# Patient Record
Sex: FEMALE | Race: WHITE | NOT HISPANIC OR LATINO | URBAN - METROPOLITAN AREA
[De-identification: names, ages, dates, MRNs, and addresses within clinical notes are randomized per-mention and may not be internally consistent; named-entity substitution may affect disease eponyms.]

---

## 2017-03-24 ENCOUNTER — APPOINTMENT (RX ONLY)
Dept: URBAN - METROPOLITAN AREA TELEMEDICINE 2 | Facility: TELEMEDICINE | Age: 27
Setting detail: DERMATOLOGY
End: 2017-03-24

## 2017-03-24 DIAGNOSIS — Z41.9 ENCOUNTER FOR PROCEDURE FOR PURPOSES OTHER THAN REMEDYING HEALTH STATE, UNSPECIFIED: ICD-10-CM

## 2017-03-24 PROBLEM — L70.0 ACNE VULGARIS: Status: ACTIVE | Noted: 2017-03-24

## 2017-03-24 PROCEDURE — ? MEDICAL CONSULTATION: DERMAPEN

## 2017-03-24 PROCEDURE — ? MEDICAL CONSULTATION: BROWN SPOTS

## 2017-03-24 PROCEDURE — ? BOTOX

## 2017-03-24 PROCEDURE — ? OTHER (COSMETIC)

## 2017-03-24 PROCEDURE — ? RECOMMENDATIONS

## 2017-03-24 PROCEDURE — ? COSMETIC CONSULTATION: BOTULINUM TOXIN

## 2017-03-24 ASSESSMENT — LOCATION SIMPLE DESCRIPTION DERM
LOCATION SIMPLE: LEFT FOREHEAD
LOCATION SIMPLE: LEFT CHEEK

## 2017-03-24 ASSESSMENT — LOCATION ZONE DERM: LOCATION ZONE: FACE

## 2017-03-24 ASSESSMENT — LOCATION DETAILED DESCRIPTION DERM
LOCATION DETAILED: LEFT INFERIOR CENTRAL MALAR CHEEK
LOCATION DETAILED: LEFT MEDIAL FOREHEAD

## 2017-03-24 NOTE — PROCEDURE: BOTOX
Lateral Platysmal Bands Units: 0
Detail Level: Detailed
Additional Area 2 Location: Lower lids
Forehead Units: 10
Expiration Date (Month Year): 10/2019
Reconstitution Date (Optional): 03/24/2017
Glabellar Complex Units: 20
Dilution (U/0.1 Cc): 5
Additional Area 1 Location: Brows
Post-Care Instructions: Patient instructed to not lie down for 4 hours and limit physical activity for 24 hours. Patient instructed not to travel by airplane for 48 hours.
Price (Use Numbers Only, No Special Characters Or $): 390
Consent: Written consent obtained. Risks include but not limited to lid/brow ptosis, bruising, swelling, diplopia, temporary effect, incomplete chemical denervation.

## 2017-03-24 NOTE — PROCEDURE: OTHER (COSMETIC)
Other (Free Text): Discussed consider adding 4 units to brows at follow up if needed
Detail Level: Zone

## 2017-04-05 ENCOUNTER — APPOINTMENT (RX ONLY)
Dept: URBAN - METROPOLITAN AREA TELEMEDICINE 2 | Facility: TELEMEDICINE | Age: 27
Setting detail: DERMATOLOGY
End: 2017-04-05

## 2017-04-05 DIAGNOSIS — Z41.9 ENCOUNTER FOR PROCEDURE FOR PURPOSES OTHER THAN REMEDYING HEALTH STATE, UNSPECIFIED: ICD-10-CM

## 2017-04-05 PROCEDURE — ? COSMETIC FOLLOW-UP

## 2017-04-05 PROCEDURE — ? OTHER (COSMETIC)

## 2017-04-05 PROCEDURE — ? BOTOX

## 2017-04-05 PROCEDURE — ? COSMETIC CONSULTATION: BOTULINUM TOXIN

## 2017-04-05 ASSESSMENT — LOCATION SIMPLE DESCRIPTION DERM
LOCATION SIMPLE: LEFT EYEBROW
LOCATION SIMPLE: RIGHT EYEBROW

## 2017-04-05 ASSESSMENT — LOCATION DETAILED DESCRIPTION DERM
LOCATION DETAILED: RIGHT CENTRAL EYEBROW
LOCATION DETAILED: LEFT CENTRAL EYEBROW

## 2017-04-05 ASSESSMENT — LOCATION ZONE DERM: LOCATION ZONE: FACE

## 2017-04-05 NOTE — PROCEDURE: BOTOX
Additional Area 2 Units: 0
Lot #: [id number]
Additional Area 1 Location: Brows
Post-Care Instructions: Patient instructed to not lie down for 4 hours and limit physical activity for 24 hours. Patient instructed not to travel by airplane for 48 hours.
Consent: Written consent obtained. Risks include but not limited to lid/brow ptosis, bruising, swelling, diplopia, temporary effect, incomplete chemical denervation.
Reconstitution Date (Optional): 04/04/2017
Dilution (U/0.1 Cc): 5
Expiration Date (Month Year): 10/2019
Price (Use Numbers Only, No Special Characters Or $): 52
Additional Area 1 Units: 4
Detail Level: Detailed
Additional Area 2 Location: Lower lids

## 2017-04-05 NOTE — PROCEDURE: COSMETIC FOLLOW-UP
Detail Level: Zone
Patient Satisfaction: Pleased
Global Improvement: Very Good
Treatment (Optional): Botox